# Patient Record
Sex: FEMALE | Race: WHITE
[De-identification: names, ages, dates, MRNs, and addresses within clinical notes are randomized per-mention and may not be internally consistent; named-entity substitution may affect disease eponyms.]

---

## 2019-02-25 ENCOUNTER — HOSPITAL ENCOUNTER (OUTPATIENT)
Dept: HOSPITAL 53 - M RAD | Age: 49
End: 2019-02-25
Attending: NURSE PRACTITIONER
Payer: COMMERCIAL

## 2019-02-25 DIAGNOSIS — R10.9: Primary | ICD-10-CM

## 2019-02-25 PROCEDURE — 74177 CT ABD & PELVIS W/CONTRAST: CPT

## 2019-02-25 NOTE — REP
Clinical:  Right lower quadrant pain.

 

Technique:  Axial contrast enhanced images from the lung bases to the pubic

symphysis using oral (per protocol) and 100 ml Isovue 370 intravenous contrast

material with coronal and sagittal re-formations.

 

Findings:

Lung bases are clear.  Small hiatal hernia noted at the gastroesophageal

junction.

 

Liver, spleen, pancreas, bilateral adrenal glands and kidneys are normal.  The

patient is status post gastric bypass surgery.  There is no evidence for bowel

obstruction or acute inflammatory process.  Normal terminal ileum, cecum, and

appendix identified in the right lower quadrant.  Sigmoid diverticula noted

without acute diverticulitis.  Pelvis demonstrates normal bladder and

age-appropriate uterus/adnexa.  No pelvic fluid or ascites.  No free air.  No

adenopathy.  Abdominal aorta without aneurysm or dissection.  Musculoskeletal

structures without focal osseous abnormality.

 

Impression:

1.  No acute abdominopelvic pathology appreciated.

2.  Small hiatal hernia.

3.  Few scattered sigmoid diverticula without acute diverticulitis.

 

 

Electronically Signed by

Isrrael Devi MD 02/25/2019 01:30 P

## 2021-09-22 ENCOUNTER — OFFICE VISIT (OUTPATIENT)
Dept: URBAN - METROPOLITAN AREA CLINIC 3 | Facility: CLINIC | Age: 51
End: 2021-09-22
Payer: COMMERCIAL

## 2021-09-22 DIAGNOSIS — H02.825 CYSTS OF LEFT LOWER EYELID: Primary | ICD-10-CM

## 2021-09-22 PROCEDURE — 99202 OFFICE O/P NEW SF 15 MIN: CPT | Performed by: OPTOMETRIST

## 2021-09-22 ASSESSMENT — INTRAOCULAR PRESSURE
OS: 19
OD: 15

## 2021-09-22 NOTE — IMPRESSION/PLAN
Impression: Cysts of left lower eyelid: H02.825. Plan: Patient is symptomatic. Risks Benefits and Alternatives discussed. Patient agrees to proceed with procedure. Left lower lid.

## 2021-09-23 ENCOUNTER — PROCEDURE (OUTPATIENT)
Dept: URBAN - METROPOLITAN AREA CLINIC 4 | Facility: CLINIC | Age: 51
End: 2021-09-23
Payer: COMMERCIAL

## 2021-09-23 DIAGNOSIS — D23.122 OTHER BENIGN NEOPLASM OF SKIN OF LEFT LOWER EYELID, INCLUDING CANTHUS: Primary | ICD-10-CM

## 2021-09-23 PROCEDURE — 11440 EXC FACE-MM B9+MARG 0.5 CM/<: CPT | Performed by: OPHTHALMOLOGY

## 2021-09-23 RX ORDER — ERYTHROMYCIN 5 MG/G
OINTMENT OPHTHALMIC
Qty: 3.5 | Refills: 0 | Status: ACTIVE
Start: 2021-09-23

## 2021-09-23 NOTE — IMPRESSION/PLAN
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 Plan: Eyelid Neoplasm Removal LLL- appears to be a benign lesion but the patients is symptomatic and desires removal. Risk, benefits and alternatives discussed and understood, informed consent obtained. Plan to perform today. Patient tolerated well the procedure. Start erythromycin ointment BID for 1 week. RTC prn.

## 2021-09-27 ENCOUNTER — OFFICE VISIT (OUTPATIENT)
Dept: URBAN - METROPOLITAN AREA CLINIC 4 | Facility: CLINIC | Age: 51
End: 2021-09-27
Payer: COMMERCIAL

## 2021-09-27 DIAGNOSIS — H53.8 OTHER VISUAL DISTURBANCES: Primary | ICD-10-CM

## 2021-09-27 ASSESSMENT — INTRAOCULAR PRESSURE
OS: 14
OD: 13

## 2021-09-27 ASSESSMENT — KERATOMETRY
OS: 46.63
OD: 47.00

## 2021-09-27 NOTE — IMPRESSION/PLAN
Impression: Other visual disturbances: H53.8. Plan: Laser Vision Correction Evaluation: Pentacam today shows regular astigmatism w/o signs of posterior corneal ectasia. Keratometry and pachymetry WNL. AR shows refractive error within corrective limits. Patient with reasonable expectations and no major contraindications for laser vision correction. Will need LASIK left eye only for monovision result. Currently wearing monovision contact lens LT eye +2.25. Gave trial +2.50 for LT eye to compare to habitual CLRX. Will call with preferred power. Recommend formal evaluation for refractive surgery after discontinuing contact lenses for at least 2 weeks. Gave pricing and surgery dates available. RTC if interested.